# Patient Record
Sex: FEMALE | Race: WHITE | NOT HISPANIC OR LATINO
[De-identification: names, ages, dates, MRNs, and addresses within clinical notes are randomized per-mention and may not be internally consistent; named-entity substitution may affect disease eponyms.]

---

## 2019-09-13 PROBLEM — Z00.00 ENCOUNTER FOR PREVENTIVE HEALTH EXAMINATION: Status: ACTIVE | Noted: 2019-09-13

## 2019-09-17 ENCOUNTER — APPOINTMENT (OUTPATIENT)
Dept: CARDIOLOGY | Facility: CLINIC | Age: 39
End: 2019-09-17
Payer: MEDICAID

## 2019-09-17 VITALS
HEART RATE: 70 BPM | BODY MASS INDEX: 24.84 KG/M2 | SYSTOLIC BLOOD PRESSURE: 100 MMHG | HEIGHT: 62 IN | DIASTOLIC BLOOD PRESSURE: 66 MMHG | WEIGHT: 135 LBS

## 2019-09-17 DIAGNOSIS — Z78.9 OTHER SPECIFIED HEALTH STATUS: ICD-10-CM

## 2019-09-17 DIAGNOSIS — Z82.49 FAMILY HISTORY OF ISCHEMIC HEART DISEASE AND OTHER DISEASES OF THE CIRCULATORY SYSTEM: ICD-10-CM

## 2019-09-17 PROCEDURE — 93000 ELECTROCARDIOGRAM COMPLETE: CPT

## 2019-09-17 PROCEDURE — 99204 OFFICE O/P NEW MOD 45 MIN: CPT

## 2019-09-17 NOTE — HISTORY OF PRESENT ILLNESS
[FreeTextEntry1] : 39 yo female with no sign hx is here with above complains\par last few months recurrent exertional cp, pressure like, relieved by rest, associated with sob and palpitations, no other cvs complains\par non smoker, et is good\par + fhx of AAA\par escobar is otherwise as below

## 2019-09-17 NOTE — REASON FOR VISIT
[Consultation] : a consultation regarding [Chest Pain] : chest pain [Dyspnea] : dyspnea [Palpitations] : palpitations

## 2019-09-17 NOTE — ASSESSMENT
[FreeTextEntry1] : 39 yo female with pmhx and presentation as above\par low to intermediate risk for cad\par ? arrhythmia\par avoid caffeine\par echo to r/out structural heart dz given fhx of aaa\par est\par obtain labs from pmd\par aggressive risk modif\par diet and act as tolerated\par rtc for testing pending ins approval

## 2019-09-24 ENCOUNTER — APPOINTMENT (OUTPATIENT)
Dept: CARDIOLOGY | Facility: CLINIC | Age: 39
End: 2019-09-24
Payer: MEDICAID

## 2019-09-24 PROCEDURE — 93306 TTE W/DOPPLER COMPLETE: CPT

## 2020-07-21 ENCOUNTER — APPOINTMENT (OUTPATIENT)
Dept: CARDIOLOGY | Facility: CLINIC | Age: 40
End: 2020-07-21
Payer: MEDICAID

## 2020-07-21 VITALS — WEIGHT: 135 LBS | BODY MASS INDEX: 24.69 KG/M2 | TEMPERATURE: 97.9 F

## 2020-07-21 VITALS — DIASTOLIC BLOOD PRESSURE: 66 MMHG | SYSTOLIC BLOOD PRESSURE: 98 MMHG | HEART RATE: 79 BPM

## 2020-07-21 PROCEDURE — 93000 ELECTROCARDIOGRAM COMPLETE: CPT

## 2020-07-21 PROCEDURE — 99214 OFFICE O/P EST MOD 30 MIN: CPT

## 2020-07-21 NOTE — PHYSICAL EXAM
[General Appearance - Well Developed] : well developed [Normal Appearance] : normal appearance [Well Groomed] : well groomed [General Appearance - Well Nourished] : well nourished [No Deformities] : no deformities [General Appearance - In No Acute Distress] : no acute distress [Normal Oral Mucosa] : normal oral mucosa [No Oral Pallor] : no oral pallor [Normal Jugular Venous A Waves Present] : normal jugular venous A waves present [No Oral Cyanosis] : no oral cyanosis [Normal Jugular Venous V Waves Present] : normal jugular venous V waves present [No Jugular Venous Esquivel A Waves] : no jugular venous esquivel A waves [Respiration, Rhythm And Depth] : normal respiratory rhythm and effort [Exaggerated Use Of Accessory Muscles For Inspiration] : no accessory muscle use [Auscultation Breath Sounds / Voice Sounds] : lungs were clear to auscultation bilaterally [Heart Rate And Rhythm] : heart rate and rhythm were normal [Heart Sounds] : normal S1 and S2 [Murmurs] : no murmurs present [Abdomen Tenderness] : non-tender [Abdomen Soft] : soft [Abdomen Mass (___ Cm)] : no abdominal mass palpated [Nail Clubbing] : no clubbing of the fingernails [Cyanosis, Localized] : no localized cyanosis [] : no ischemic changes [Petechial Hemorrhages (___cm)] : no petechial hemorrhages [Oriented To Time, Place, And Person] : oriented to person, place, and time [Skin Color & Pigmentation] : normal skin color and pigmentation

## 2020-07-21 NOTE — ASSESSMENT
[FreeTextEntry1] : 38 yo female with pmhx and presentation as above\par intermediate risk for cad\par PFO\par set up for ccta to r/out cad and assess PFO\par obtain labs from pmd\par aggressive risk modif\par diet and act as tolerated\par rtc after testing

## 2020-07-21 NOTE — HISTORY OF PRESENT ILLNESS
[FreeTextEntry1] : 40 yo female with pmhx as below is here for a f/up visit\par last few months recurrent exertional cp, pressure like, relieved by rest, associated with sob and palpitations, no other cvs complains\par non smoker, et is good\par + fhx of  AAA\par last echo- small PFO\par escobar is otherwise as below

## 2023-02-12 NOTE — PHYSICAL EXAM
[Normal Appearance] : normal appearance [General Appearance - Well Developed] : well developed [Well Groomed] : well groomed [General Appearance - Well Nourished] : well nourished [No Deformities] : no deformities [General Appearance - In No Acute Distress] : no acute distress [Normal Oral Mucosa] : normal oral mucosa [No Oral Pallor] : no oral pallor [No Oral Cyanosis] : no oral cyanosis [Normal Jugular Venous A Waves Present] : normal jugular venous A waves present [Normal Jugular Venous V Waves Present] : normal jugular venous V waves present [No Jugular Venous Esquivel A Waves] : no jugular venous esquivel A waves [Respiration, Rhythm And Depth] : normal respiratory rhythm and effort [Exaggerated Use Of Accessory Muscles For Inspiration] : no accessory muscle use [Auscultation Breath Sounds / Voice Sounds] : lungs were clear to auscultation bilaterally [Heart Rate And Rhythm] : heart rate and rhythm were normal [Heart Sounds] : normal S1 and S2 [Murmurs] : no murmurs present [Abdomen Soft] : soft [Abdomen Tenderness] : non-tender [Abdomen Mass (___ Cm)] : no abdominal mass palpated [Nail Clubbing] : no clubbing of the fingernails [Petechial Hemorrhages (___cm)] : no petechial hemorrhages [Cyanosis, Localized] : no localized cyanosis [] : no ischemic changes [Skin Color & Pigmentation] : normal skin color and pigmentation 90 [Oriented To Time, Place, And Person] : oriented to person, place, and time

## 2023-06-21 ENCOUNTER — APPOINTMENT (OUTPATIENT)
Dept: CARDIOLOGY | Facility: CLINIC | Age: 43
End: 2023-06-21
Payer: MEDICAID

## 2023-06-21 ENCOUNTER — RESULT CHARGE (OUTPATIENT)
Age: 43
End: 2023-06-21

## 2023-06-21 VITALS
HEART RATE: 80 BPM | TEMPERATURE: 97.8 F | OXYGEN SATURATION: 98 % | BODY MASS INDEX: 25.58 KG/M2 | WEIGHT: 139 LBS | HEIGHT: 62 IN | SYSTOLIC BLOOD PRESSURE: 102 MMHG | DIASTOLIC BLOOD PRESSURE: 65 MMHG | RESPIRATION RATE: 16 BRPM

## 2023-06-21 DIAGNOSIS — Q21.1 ATRIAL SEPTAL DEFECT: ICD-10-CM

## 2023-06-21 PROCEDURE — 93000 ELECTROCARDIOGRAM COMPLETE: CPT

## 2023-06-21 PROCEDURE — 99214 OFFICE O/P EST MOD 30 MIN: CPT | Mod: 25

## 2023-06-21 RX ORDER — CHROMIUM 200 MCG
TABLET ORAL
Refills: 0 | Status: DISCONTINUED | COMMUNITY
End: 2023-06-21

## 2023-06-21 NOTE — HISTORY OF PRESENT ILLNESS
[FreeTextEntry1] : 41 y/o lady with history as above, was last seen by Dr. Eisenberg 3 years ago, presents for evaluation of episodes of chest pain and dyspnea with walking and bending over. + cough.  Patient reports she recently had COVID infection. She was evaluated by PMD and GI and started on Protonix, but still has cough. Patient reports history of rheumatic disease as a child, but condon not know the details. Prior w/u reviewed -\par CCTA 2020 - normal coronaries. \par Echo in 2019, normal EF, mild AI, MR. Possible PFO vs ASD, not described on CCTA.\par

## 2023-06-21 NOTE — REVIEW OF SYSTEMS
[SOB] : shortness of breath [Chest Discomfort] : chest discomfort [Cough] : cough [Negative] : Heme/Lymph

## 2023-06-21 NOTE — ASSESSMENT
[FreeTextEntry1] : 41 y/o female with history as above\par \par Recent COVID\par SWENSON, cough, chets pain\par \par CCTA in 2020 - normal coronaries\par Possible valvular disease\par Possible PFO/ASD\par \par \par Schedule echo\par Check Lipid panel, BNP\par F/u with GI\par Consider pulmonary evaluation.\par Return after the tests.

## 2023-07-21 ENCOUNTER — APPOINTMENT (OUTPATIENT)
Dept: CARDIOLOGY | Facility: CLINIC | Age: 43
End: 2023-07-21
Payer: MEDICAID

## 2023-07-21 ENCOUNTER — NON-APPOINTMENT (OUTPATIENT)
Age: 43
End: 2023-07-21

## 2023-07-21 DIAGNOSIS — R00.2 PALPITATIONS: ICD-10-CM

## 2023-07-21 PROCEDURE — 93306 TTE W/DOPPLER COMPLETE: CPT

## 2023-07-21 PROCEDURE — 99213 OFFICE O/P EST LOW 20 MIN: CPT | Mod: 25

## 2023-07-21 NOTE — ASSESSMENT
[FreeTextEntry1] : 43 y/o female with history as above\par \par Recent COVID\par SWENSON, cough, chets pain\par \par CCTA in 2020 - normal coronaries\par Possible valvular disease\par Possible PFO/ASD\par \par \par 2d echo - no evidence of ASD. Normal EF.\par Holter - AT - will give low dose BB\par Check Lipid panel, BNP\par F/u with GI\par Consider pulmonary evaluation.\par Return in 3 months

## 2023-07-21 NOTE — HISTORY OF PRESENT ILLNESS
[FreeTextEntry1] : 7/21/23\par Presenting for f/u. Holter and echo reviewed, discussed with the patient. Occasional AT - short. Normal EF.\par \par 41 y/o lady with history as above, was last seen by Dr. Eisenberg 3 years ago, presents for evaluation of episodes of chest pain and dyspnea with walking and bending over. + cough.  Patient reports she recently had COVID infection. She was evaluated by PMD and GI and started on Protonix, but still has cough. Patient reports history of rheumatic disease as a child, but condon not know the details. Prior w/u reviewed -\par CCTA 2020 - normal coronaries. \par Echo in 2019, normal EF, mild AI, MR. Possible PFO vs ASD, not described on CCTA.\par

## 2024-06-19 ENCOUNTER — APPOINTMENT (OUTPATIENT)
Dept: CARDIOLOGY | Facility: CLINIC | Age: 44
End: 2024-06-19
Payer: MEDICAID

## 2024-06-19 ENCOUNTER — RESULT CHARGE (OUTPATIENT)
Age: 44
End: 2024-06-19

## 2024-06-19 VITALS
SYSTOLIC BLOOD PRESSURE: 95 MMHG | DIASTOLIC BLOOD PRESSURE: 65 MMHG | HEIGHT: 62 IN | OXYGEN SATURATION: 99 % | TEMPERATURE: 96.6 F | RESPIRATION RATE: 14 BRPM | WEIGHT: 144 LBS | HEART RATE: 62 BPM | BODY MASS INDEX: 26.5 KG/M2

## 2024-06-19 DIAGNOSIS — I47.19 OTHER SUPRAVENTRICULAR TACHYCARDIA: ICD-10-CM

## 2024-06-19 DIAGNOSIS — R07.9 CHEST PAIN, UNSPECIFIED: ICD-10-CM

## 2024-06-19 DIAGNOSIS — R06.09 OTHER FORMS OF DYSPNEA: ICD-10-CM

## 2024-06-19 PROCEDURE — 99214 OFFICE O/P EST MOD 30 MIN: CPT | Mod: 25

## 2024-06-19 PROCEDURE — 93000 ELECTROCARDIOGRAM COMPLETE: CPT

## 2024-06-19 PROCEDURE — G2211 COMPLEX E/M VISIT ADD ON: CPT | Mod: NC,1L

## 2024-06-19 RX ORDER — PANTOPRAZOLE 20 MG/1
20 TABLET, DELAYED RELEASE ORAL TWICE DAILY
Refills: 0 | Status: DISCONTINUED | COMMUNITY
End: 2024-06-19

## 2024-06-19 RX ORDER — LORATADINE 10 MG/1
10 TABLET ORAL AS DIRECTED
Refills: 0 | Status: ACTIVE | COMMUNITY

## 2024-06-19 NOTE — ASSESSMENT
[FreeTextEntry1] : 42 y/o female with history as above   CCTA in 2020 - normal coronaries Possible valvular disease Possible PFO/ASD   2d echo in the past - no evidence of ASD. Normal EF. Schedule for stress test and echo to assess, given increased symptoms. Holter - AT - could not tolerate low dose BB due to low BP Low BNP (5) - no evidence of CHF Patient reassured. recommend f/u with GI  Return in 3 months

## 2024-06-19 NOTE — HISTORY OF PRESENT ILLNESS
[FreeTextEntry1] : 42 y/o lady with history as described below, was previously seen by Dr. Eisenberg 4 years ago, at that time had a negative cardiac w/u, presents for evaluation of recurrent episodes of chest pain and dyspnea with walking and leaning forward. Patient reports history of rheumatic disease as a child, but condon not know the details. Prior w/u reviewed. She reports the chest pains are more frequent and she uses ASA for it. Had episodes of AT in the past on Holter - brief. Reports no palpitations at this time.  CCTA 2020 - normal coronaries.  Echo in 2019, normal EF, mild AI, MR. Possible PFO vs ASD, not described on CCTA.

## 2024-07-10 ENCOUNTER — APPOINTMENT (OUTPATIENT)
Dept: CARDIOLOGY | Facility: CLINIC | Age: 44
End: 2024-07-10
Payer: MEDICAID

## 2024-07-10 DIAGNOSIS — R00.2 PALPITATIONS: ICD-10-CM

## 2024-07-10 DIAGNOSIS — I47.19 OTHER SUPRAVENTRICULAR TACHYCARDIA: ICD-10-CM

## 2024-07-10 DIAGNOSIS — R06.09 OTHER FORMS OF DYSPNEA: ICD-10-CM

## 2024-07-10 DIAGNOSIS — R07.9 CHEST PAIN, UNSPECIFIED: ICD-10-CM

## 2024-07-10 PROCEDURE — 93306 TTE W/DOPPLER COMPLETE: CPT

## 2024-07-10 PROCEDURE — 99214 OFFICE O/P EST MOD 30 MIN: CPT | Mod: 25

## 2024-07-10 PROCEDURE — 93015 CV STRESS TEST SUPVJ I&R: CPT

## 2024-12-06 ENCOUNTER — NON-APPOINTMENT (OUTPATIENT)
Age: 44
End: 2024-12-06

## 2024-12-06 ENCOUNTER — APPOINTMENT (OUTPATIENT)
Dept: CARDIOLOGY | Facility: CLINIC | Age: 44
End: 2024-12-06
Payer: MEDICAID

## 2024-12-06 VITALS
BODY MASS INDEX: 25.76 KG/M2 | OXYGEN SATURATION: 98 % | SYSTOLIC BLOOD PRESSURE: 100 MMHG | RESPIRATION RATE: 14 BRPM | DIASTOLIC BLOOD PRESSURE: 70 MMHG | WEIGHT: 140 LBS | HEART RATE: 66 BPM | HEIGHT: 62 IN

## 2024-12-06 DIAGNOSIS — R00.2 PALPITATIONS: ICD-10-CM

## 2024-12-06 DIAGNOSIS — I47.19 OTHER SUPRAVENTRICULAR TACHYCARDIA: ICD-10-CM

## 2024-12-06 DIAGNOSIS — R07.9 CHEST PAIN, UNSPECIFIED: ICD-10-CM

## 2024-12-06 PROCEDURE — G2211 COMPLEX E/M VISIT ADD ON: CPT | Mod: NC

## 2024-12-06 PROCEDURE — 99214 OFFICE O/P EST MOD 30 MIN: CPT

## 2024-12-06 PROCEDURE — 93000 ELECTROCARDIOGRAM COMPLETE: CPT

## 2024-12-06 RX ORDER — METOPROLOL SUCCINATE 25 MG/1
25 TABLET, EXTENDED RELEASE ORAL DAILY
Qty: 30 | Refills: 1 | Status: ACTIVE | COMMUNITY
Start: 2024-12-06 | End: 1900-01-01

## 2025-05-16 ENCOUNTER — NON-APPOINTMENT (OUTPATIENT)
Age: 45
End: 2025-05-16

## 2025-05-16 ENCOUNTER — APPOINTMENT (OUTPATIENT)
Dept: CARDIOLOGY | Facility: CLINIC | Age: 45
End: 2025-05-16
Payer: MEDICAID

## 2025-05-16 VITALS
WEIGHT: 140 LBS | DIASTOLIC BLOOD PRESSURE: 60 MMHG | SYSTOLIC BLOOD PRESSURE: 95 MMHG | BODY MASS INDEX: 25.76 KG/M2 | HEART RATE: 74 BPM | RESPIRATION RATE: 14 BRPM | TEMPERATURE: 95.2 F | HEIGHT: 62 IN | OXYGEN SATURATION: 96 %

## 2025-05-16 DIAGNOSIS — R00.2 PALPITATIONS: ICD-10-CM

## 2025-05-16 DIAGNOSIS — R07.9 CHEST PAIN, UNSPECIFIED: ICD-10-CM

## 2025-05-16 DIAGNOSIS — I47.19 OTHER SUPRAVENTRICULAR TACHYCARDIA: ICD-10-CM

## 2025-05-16 PROCEDURE — 93000 ELECTROCARDIOGRAM COMPLETE: CPT

## 2025-05-16 PROCEDURE — G2211 COMPLEX E/M VISIT ADD ON: CPT | Mod: NC

## 2025-05-16 PROCEDURE — 99214 OFFICE O/P EST MOD 30 MIN: CPT

## 2025-05-16 RX ORDER — ASPIRIN 81 MG
81 TABLET, DELAYED RELEASE (ENTERIC COATED) ORAL DAILY
Refills: 0 | Status: ACTIVE | COMMUNITY

## 2025-06-06 ENCOUNTER — NON-APPOINTMENT (OUTPATIENT)
Age: 45
End: 2025-06-06

## 2025-06-06 ENCOUNTER — APPOINTMENT (OUTPATIENT)
Dept: CARDIOLOGY | Facility: CLINIC | Age: 45
End: 2025-06-06
Payer: MEDICAID

## 2025-06-06 PROCEDURE — 93306 TTE W/DOPPLER COMPLETE: CPT

## 2025-06-06 PROCEDURE — 99213 OFFICE O/P EST LOW 20 MIN: CPT | Mod: 25
